# Patient Record
Sex: FEMALE | Race: WHITE
[De-identification: names, ages, dates, MRNs, and addresses within clinical notes are randomized per-mention and may not be internally consistent; named-entity substitution may affect disease eponyms.]

---

## 2017-09-18 ENCOUNTER — HOSPITAL ENCOUNTER (INPATIENT)
Dept: HOSPITAL 25 - MCHOBOUT | Age: 32
LOS: 2 days | Discharge: HOME | End: 2017-09-20
Attending: MIDWIFE | Admitting: MIDWIFE
Payer: COMMERCIAL

## 2017-09-18 DIAGNOSIS — Z3A.41: ICD-10-CM

## 2017-09-18 DIAGNOSIS — O48.0: Primary | ICD-10-CM

## 2017-09-18 LAB
HCT VFR BLD AUTO: 33 % (ref 35–47)
HGB BLD-MCNC: 10.8 G/DL (ref 12–16)
MCH RBC QN AUTO: 26 PG (ref 27–31)
MCHC RBC AUTO-ENTMCNC: 33 G/DL (ref 31–36)
MCV RBC AUTO: 78 FL (ref 80–97)
RBC # BLD AUTO: 4.19 10^6/UL (ref 4–5.4)
WBC # BLD AUTO: 12.4 10^3/UL (ref 3.5–10.8)

## 2017-09-18 PROCEDURE — 85025 COMPLETE CBC W/AUTO DIFF WBC: CPT

## 2017-09-18 PROCEDURE — 85461 HEMOGLOBIN FETAL: CPT

## 2017-09-18 PROCEDURE — 90686 IIV4 VACC NO PRSV 0.5 ML IM: CPT

## 2017-09-18 PROCEDURE — 86850 RBC ANTIBODY SCREEN: CPT

## 2017-09-18 PROCEDURE — 86900 BLOOD TYPING SEROLOGIC ABO: CPT

## 2017-09-18 PROCEDURE — 86901 BLOOD TYPING SEROLOGIC RH(D): CPT

## 2017-09-18 PROCEDURE — 36415 COLL VENOUS BLD VENIPUNCTURE: CPT

## 2017-09-19 PROCEDURE — 0HQ9XZZ REPAIR PERINEUM SKIN, EXTERNAL APPROACH: ICD-10-PCS | Performed by: MIDWIFE

## 2017-09-19 PROCEDURE — 10907ZC DRAINAGE OF AMNIOTIC FLUID, THERAPEUTIC FROM PRODUCTS OF CONCEPTION, VIA NATURAL OR ARTIFICIAL OPENING: ICD-10-PCS | Performed by: MIDWIFE

## 2017-09-19 PROCEDURE — 4A1HXCZ MONITORING OF PRODUCTS OF CONCEPTION, CARDIAC RATE, EXTERNAL APPROACH: ICD-10-PCS | Performed by: MIDWIFE

## 2017-09-19 RX ADMIN — IBUPROFEN PRN MG: 600 TABLET, FILM COATED ORAL at 14:30

## 2017-09-19 RX ADMIN — DOCUSATE SODIUM SCH MG: 100 CAPSULE, LIQUID FILLED ORAL at 08:51

## 2017-09-19 RX ADMIN — IBUPROFEN PRN MG: 600 TABLET, FILM COATED ORAL at 20:25

## 2017-09-19 RX ADMIN — DOCUSATE SODIUM SCH MG: 100 CAPSULE, LIQUID FILLED ORAL at 14:30

## 2017-09-19 RX ADMIN — IBUPROFEN PRN MG: 600 TABLET, FILM COATED ORAL at 08:52

## 2017-09-19 RX ADMIN — DOCUSATE SODIUM SCH MG: 100 CAPSULE, LIQUID FILLED ORAL at 20:25

## 2017-09-20 VITALS — SYSTOLIC BLOOD PRESSURE: 107 MMHG | DIASTOLIC BLOOD PRESSURE: 64 MMHG

## 2017-09-20 LAB
HCT VFR BLD AUTO: 32 % (ref 35–47)
HGB BLD-MCNC: 10.4 G/DL (ref 12–16)
MCH RBC QN AUTO: 26 PG (ref 27–31)
MCHC RBC AUTO-ENTMCNC: 33 G/DL (ref 31–36)
MCV RBC AUTO: 79 FL (ref 80–97)
RBC # BLD AUTO: 3.99 10^6/UL (ref 4–5.4)
WBC # BLD AUTO: 7.7 10^3/UL (ref 3.5–10.8)

## 2017-09-20 RX ADMIN — DOCUSATE SODIUM SCH MG: 100 CAPSULE, LIQUID FILLED ORAL at 10:29

## 2019-12-02 ENCOUNTER — HOSPITAL ENCOUNTER (EMERGENCY)
Dept: HOSPITAL 25 - UCEAST | Age: 34
Discharge: HOME | End: 2019-12-02
Payer: COMMERCIAL

## 2019-12-02 VITALS — DIASTOLIC BLOOD PRESSURE: 74 MMHG | SYSTOLIC BLOOD PRESSURE: 127 MMHG

## 2019-12-02 DIAGNOSIS — Z97.5: ICD-10-CM

## 2019-12-02 DIAGNOSIS — B37.3: Primary | ICD-10-CM

## 2019-12-02 PROCEDURE — 99212 OFFICE O/P EST SF 10 MIN: CPT

## 2019-12-02 PROCEDURE — G0463 HOSPITAL OUTPT CLINIC VISIT: HCPCS

## 2019-12-02 NOTE — UC
Complaint Female HPI





- HPI Summary


HPI Summary: 





Ms. Whitney comes in complaining that she has had symptoms of yeast infection 

for about a week.  She complains of itching and discomfort interval.  She tried 

one day Monistat treatment over-the-counter which didn't work.  She called her 

OB/GYN who recommended using a 3 day treatment.  The 3 day treatment helped a 

lot including decreasing the swelling but didn't completely improve it.  She 

has no dysuria and only minimal discharge.





- History Of Current Complaint


Chief Complaint: UCGU


Stated Complaint: PERSONAL


Time Seen by Provider: 12/02/19 12:28


Hx Obtained From: Patient


Hx Last Menstrual Period: 2-3 years - has mirena IUD


Pregnant?: No - she has an IUD


Onset/Duration: Gradual Onset


Timing: Constant


Severity Initially: Moderate


Severity Currently: Mild


Pain Intensity: 0


Character: Burning - Itching


Aggravating Factor(s): Nothing


Alleviating Factor(s): Other - Monistat helped


Associated Signs And Symptoms: Positive: Negative





- Allergies/Home Medications


Allergies/Adverse Reactions: 


 Allergies











Allergy/AdvReac Type Severity Reaction Status Date / Time


 


No Known Allergies Allergy   Verified 12/02/19 12:14














PMH/Surg Hx/FS Hx/Imm Hx





- Surgical History


Surgical History: None





- Social History


Alcohol Use: Rare


Substance Use Type: None


Smoking Status (MU): Never Smoked Tobacco


Have You Smoked in the Last Year: No





- Immunization History


Most Recent Influenza Vaccination: fall 2014


Most Recent Tetanus Shot: 5/11/15


Most Recent Pneumonia Vaccination: never





Review of Systems


All Other Systems Reviewed And Are Negative: Yes


Constitutional: Positive: Negative


Skin: Positive: Negative


Genitourinary: Positive: Vaginal/Penile Burning, Vaginal/Penile Itching, Vaginal

/Penile Discharge


Is Patient Immunocompromised?: No





Physical Exam





- Summary


Physical Exam Summary: 





She is nontoxic in appearance with stable vital signs.


Triage Information Reviewed: Yes


Appearance: Well-Appearing, No Pain Distress


Vital Signs: 


 Initial Vital Signs











Temp  98.4 F   12/02/19 12:10


 


Pulse  63   12/02/19 12:10


 


Resp  15   12/02/19 12:10


 


BP  127/74   12/02/19 12:10


 


Pulse Ox  100   12/02/19 12:10











Vital Signs Reviewed: Yes


Abdominal Exam: Normal





 Complaint Female Dx





- Course


Course Of Treatment: 





I recommended since the Monistat 3 day treatment had helped significantly that 

we used Diflucan at this point.  If she continues to have problems or increased 

shortness see her OB/GYN doctor





- Differential Dx/Diagnosis


Provider Diagnosis: 


 Yeast infection








Discharge ED





- Sign-Out/Discharge


Documenting (check all that apply): Patient Departure


All imaging exams completed and their final reports reviewed: No Studies





- Discharge Plan


Condition: Stable


Disposition: HOME


Patient Education Materials:  Yeast Infection (ED)


Referrals: 


Lloyd Jackson MD [Primary Care Provider] - 





- Billing Disposition and Condition


Condition: STABLE


Disposition: Home

## 2019-12-03 NOTE — XMS REPORT
Transition of Care

 Created on:2019



Patient:ALVIN FERRER

Sex:Female

:1985

External Reference #:[id number]





Demographics







 Address  [address] [place] [id number]

 

 Home Phone  [phone]

 

 Preferred Language  Unknown

 

 Marital Status  Unknown

 

 Christianity Affiliation  Unknown

 

 Race  Unknown

 

 Additional Race(s)  Unknown

 

 Ethnic Group  Unknown









Author







 Name  Parvin Mayfield

 

 Address  Unavailable



   Unavailable



   ,









Support







 Name  Relationship  Address  Phone

 

 Unavailable  Unavailable  Unavailable  Unavailable









Care Team Providers







 Name  Role  Phone

 

 Parvin Mayfield  Primary Care Physician  Unavailable









Allergies, Adverse Reactions, Alerts







 Allergy  Code  CodeSystem  Reaction  Severity  Criticality  Status  Start



 Substance              Date



               



               

 

         Moderate      



               



               







Medications







 Medication  Medication  Medication  Start  Stop  Route  Dose  Status  Fill



   Code  CodeSystem  Date  Date        Instructions



                 



                 



                 

 

     RxNorm            



                 



                 



                 







Relevant diagnostic tests/laboratory data Narrative

No Information



Procedures







 Procedure  Code  CodeSystem  Target  Date of  Status  Service  Device  Device  
Device



 Name      Site  Procedure    Delivery  Code  Name  UID



             Location      



                   



                   

 

     [facility]   ()  2019  [address],      



             [address]      



             [id number]      



                   



                   







Encounters/Encounter Diagnoses







 Encounter  Encounter  Diagnosis  Diagnosis  Diagnosis  Date of  Service



 Name  Code  Code  Name  CodeSystem  Diagnosis  Delivery



             Location



             

 

 [facility]  [id number]      [facility]  2019  [facility]



             [facility]



             Clinic  [address] [place] [address],



             [id number]



             







Vital Signs

No Information



Social History







 Element Description  Description  Start  End  Code  CodeSystem  AdditionalInfo



     Date  Date      



             



             



             

 

 SexAssignedAtBirth  Female      F  AdministrativeGender  



             



             



             



             







Hospital Discharge Instructions







Reason For Referral







Medical Equipment

    FDA





Assessments